# Patient Record
Sex: MALE | Race: WHITE | NOT HISPANIC OR LATINO | URBAN - METROPOLITAN AREA
[De-identification: names, ages, dates, MRNs, and addresses within clinical notes are randomized per-mention and may not be internally consistent; named-entity substitution may affect disease eponyms.]

---

## 2024-01-12 ENCOUNTER — OFFICE VISIT (OUTPATIENT)
Dept: URBAN - METROPOLITAN AREA CLINIC 70 | Facility: CLINIC | Age: 69
End: 2024-01-12
Payer: MEDICARE

## 2024-01-12 ENCOUNTER — DASHBOARD ENCOUNTERS (OUTPATIENT)
Age: 69
End: 2024-01-12

## 2024-01-12 ENCOUNTER — LAB OUTSIDE AN ENCOUNTER (OUTPATIENT)
Dept: URBAN - METROPOLITAN AREA CLINIC 70 | Facility: CLINIC | Age: 69
End: 2024-01-12

## 2024-01-12 VITALS
HEART RATE: 76 BPM | DIASTOLIC BLOOD PRESSURE: 81 MMHG | HEIGHT: 68 IN | BODY MASS INDEX: 34.8 KG/M2 | SYSTOLIC BLOOD PRESSURE: 137 MMHG | WEIGHT: 229.6 LBS | TEMPERATURE: 97.8 F

## 2024-01-12 DIAGNOSIS — Z86.010 HISTORY OF COLON POLYPS: ICD-10-CM

## 2024-01-12 PROBLEM — 428283002: Status: ACTIVE | Noted: 2024-01-12

## 2024-01-12 PROCEDURE — 99203 OFFICE O/P NEW LOW 30 MIN: CPT | Performed by: NURSE PRACTITIONER

## 2024-01-12 RX ORDER — METFORMIN ER 750 MG 750 MG/1
TAKE 1 TABLET (750 MG TOTAL) BY MOUTH IN THE MORNING AND AT BEDTIME TABLET ORAL
Qty: 180 EACH | Refills: 2 | Status: ACTIVE | COMMUNITY

## 2024-01-12 RX ORDER — SEMAGLUTIDE 1.34 MG/ML
INJECT 1 MG INTO THE SKIN EVERY 7 DAYS INJECTION, SOLUTION SUBCUTANEOUS
Qty: 3 MILLILITER | Refills: 2 | Status: ACTIVE | COMMUNITY

## 2024-01-12 RX ORDER — LOSARTAN POTASSIUM AND HYDROCHLOROTHIAZIDE 100; 12.5 MG/1; MG/1
TAKE 1 TABLET BY MOUTH EVERY DAY IN THE MORNING TABLET, FILM COATED ORAL
Qty: 90 EACH | Refills: 1 | Status: ACTIVE | COMMUNITY

## 2024-01-12 RX ORDER — ATORVASTATIN CALCIUM 20 MG/1
TAKE 1 TABLET (20 MG TOTAL) BY MOUTH IN THE MORNING TABLET, FILM COATED ORAL
Qty: 90 EACH | Refills: 2 | Status: ACTIVE | COMMUNITY

## 2024-01-12 RX ORDER — GLIPIZIDE 5 MG/1
TAKE 1 TABLET (5 MG TOTAL) BY MOUTH IN THE MORNING AND IN THE EVENING BEFORE MEALS TABLET ORAL
Qty: 180 EACH | Refills: 1 | Status: ACTIVE | COMMUNITY

## 2024-01-12 NOTE — HPI-TODAY'S VISIT:
Patient is a 69 y/o male who presents today for a colonoscopy. Last colonoscopy was in 2018 with findings of polyps (15mm, 11mm, and 10mm; TA) with recall in 3 years (2021). No Fhx of colon cancer. No current GI complaints. Denies abdominal pain, unintentional wt loss, constipation, diarrhea, or rectal bleeding.

## 2024-02-02 ENCOUNTER — COLON (OUTPATIENT)
Dept: URBAN - METROPOLITAN AREA SURGERY CENTER 24 | Facility: SURGERY CENTER | Age: 69
End: 2024-02-02

## 2024-04-18 ENCOUNTER — LAB (OUTPATIENT)
Dept: URBAN - METROPOLITAN AREA CLINIC 70 | Facility: CLINIC | Age: 69
End: 2024-04-18

## 2024-04-18 ENCOUNTER — COLON (OUTPATIENT)
Dept: URBAN - METROPOLITAN AREA SURGERY CENTER 24 | Facility: SURGERY CENTER | Age: 69
End: 2024-04-18
Payer: MEDICARE

## 2024-04-18 DIAGNOSIS — Z86.010 ADENOMAS PERSONAL HISTORY OF COLONIC POLYPS: ICD-10-CM

## 2024-04-18 DIAGNOSIS — Z09 CARDIOLOGY FOLLOW-UP ENCOUNTER: ICD-10-CM

## 2024-04-18 PROBLEM — 428283002: Status: ACTIVE | Noted: 2024-04-18

## 2024-04-18 PROCEDURE — G0121 COLON CA SCRN NOT HI RSK IND: HCPCS | Performed by: INTERNAL MEDICINE

## 2024-04-18 RX ORDER — METFORMIN ER 750 MG 750 MG/1
TAKE 1 TABLET (750 MG TOTAL) BY MOUTH IN THE MORNING AND AT BEDTIME TABLET ORAL
Qty: 180 EACH | Refills: 2 | Status: ACTIVE | COMMUNITY

## 2024-04-18 RX ORDER — ATORVASTATIN CALCIUM 20 MG/1
TAKE 1 TABLET (20 MG TOTAL) BY MOUTH IN THE MORNING TABLET, FILM COATED ORAL
Qty: 90 EACH | Refills: 2 | Status: ACTIVE | COMMUNITY

## 2024-04-18 RX ORDER — GLIPIZIDE 5 MG/1
TAKE 1 TABLET (5 MG TOTAL) BY MOUTH IN THE MORNING AND IN THE EVENING BEFORE MEALS TABLET ORAL
Qty: 180 EACH | Refills: 1 | Status: ACTIVE | COMMUNITY

## 2024-04-18 RX ORDER — LOSARTAN POTASSIUM AND HYDROCHLOROTHIAZIDE 100; 12.5 MG/1; MG/1
TAKE 1 TABLET BY MOUTH EVERY DAY IN THE MORNING TABLET, FILM COATED ORAL
Qty: 90 EACH | Refills: 1 | Status: ACTIVE | COMMUNITY

## 2024-04-18 RX ORDER — SEMAGLUTIDE 1.34 MG/ML
INJECT 1 MG INTO THE SKIN EVERY 7 DAYS INJECTION, SOLUTION SUBCUTANEOUS
Qty: 3 MILLILITER | Refills: 2 | Status: ACTIVE | COMMUNITY